# Patient Record
Sex: MALE | URBAN - METROPOLITAN AREA
[De-identification: names, ages, dates, MRNs, and addresses within clinical notes are randomized per-mention and may not be internally consistent; named-entity substitution may affect disease eponyms.]

---

## 2023-07-06 ENCOUNTER — WEB ENCOUNTER (OUTPATIENT)
Dept: URBAN - METROPOLITAN AREA CLINIC 113 | Facility: CLINIC | Age: 63
End: 2023-07-06

## 2023-07-06 ENCOUNTER — OFFICE VISIT (OUTPATIENT)
Dept: URBAN - METROPOLITAN AREA CLINIC 113 | Facility: CLINIC | Age: 63
End: 2023-07-06
Payer: MEDICAID

## 2023-07-06 ENCOUNTER — DASHBOARD ENCOUNTERS (OUTPATIENT)
Age: 63
End: 2023-07-06

## 2023-07-06 VITALS
BODY MASS INDEX: 25.85 KG/M2 | HEART RATE: 56 BPM | RESPIRATION RATE: 16 BRPM | HEIGHT: 68 IN | DIASTOLIC BLOOD PRESSURE: 67 MMHG | SYSTOLIC BLOOD PRESSURE: 110 MMHG | WEIGHT: 170.6 LBS | TEMPERATURE: 95.3 F

## 2023-07-06 DIAGNOSIS — Z12.11 COLON CANCER SCREENING: ICD-10-CM

## 2023-07-06 PROCEDURE — 99203 OFFICE O/P NEW LOW 30 MIN: CPT

## 2023-07-06 PROCEDURE — 99243 OFF/OP CNSLTJ NEW/EST LOW 30: CPT

## 2023-07-06 RX ORDER — ATORVASTATIN CALCIUM 80 MG/1
1 TABLET TABLET, FILM COATED ORAL ONCE A DAY
Status: ACTIVE | COMMUNITY

## 2023-07-06 NOTE — HPI-TODAY'S VISIT:
62-year-old male with a history of chronic systolic congestive heart failure, nonrheumatic mitral regurgitation status post mitral valve clip,, tricuspid valve insufficiency, aortic stenosis, aortic insufficiency, HLD and hypertension is referred by Dr. Jermaine Sinclair for routine colon cancer screening.  A copy of today's visit will be forwarded to the referring provider.  Per referral note, most recent echocardiogram in January 2022 revealed EF of 40 to 45% with mild to moderate mitral regurgitation status post MitraClip and mild tricuspid regurgitation.  He is followed routinely by cardiology.  He was seen in the ED on 6/29 for chest pain.  Chest x-ray was unremarkable.  Nuclear stress test was negative.  EKG revealed incomplete left bundle branch block and left ventricular hypertrophy.  TTE revealed moderately dilated left ventricle with moderately depressed left ventricular contract agility globally.  Ejection fraction 40%.  Per cardiology evaluation, pain is likely musculoskeletal.  He has never had a colonoscopy. He denies a family history of colon cancer or other GI malignancy. He is followed by Dr. Islas. He does have a pacemaker. Denies any history of MI or stroke. He denies heartburn or regurgitation. Bowel movements are regular and without blood. Denies nausea, vomiting or abdominal pain.

## 2023-10-02 ENCOUNTER — TELEPHONE ENCOUNTER (OUTPATIENT)
Dept: URBAN - METROPOLITAN AREA CLINIC 13 | Facility: CLINIC | Age: 63
End: 2023-10-02

## 2023-10-10 ENCOUNTER — OFFICE VISIT (OUTPATIENT)
Dept: URBAN - METROPOLITAN AREA MEDICAL CENTER 19 | Facility: MEDICAL CENTER | Age: 63
End: 2023-10-10
Payer: MEDICAID

## 2023-10-10 DIAGNOSIS — D12.5 ADENOMA OF SIGMOID COLON: ICD-10-CM

## 2023-10-10 DIAGNOSIS — Z12.11 COLON CANCER SCREENING: ICD-10-CM

## 2023-10-10 PROCEDURE — 45385 COLONOSCOPY W/LESION REMOVAL: CPT | Performed by: INTERNAL MEDICINE

## 2023-10-10 RX ORDER — ATORVASTATIN CALCIUM 80 MG/1
1 TABLET TABLET, FILM COATED ORAL ONCE A DAY
Status: ACTIVE | COMMUNITY